# Patient Record
Sex: MALE | Race: BLACK OR AFRICAN AMERICAN | NOT HISPANIC OR LATINO | Employment: OTHER | URBAN - METROPOLITAN AREA
[De-identification: names, ages, dates, MRNs, and addresses within clinical notes are randomized per-mention and may not be internally consistent; named-entity substitution may affect disease eponyms.]

---

## 2019-05-10 PROBLEM — I83.019 VENOUS STASIS ULCERS OF BOTH LOWER EXTREMITIES: Status: ACTIVE | Noted: 2019-05-10

## 2019-05-10 PROBLEM — I42.8 NONISCHEMIC CARDIOMYOPATHY: Status: ACTIVE | Noted: 2019-05-10

## 2019-05-10 PROBLEM — L97.929 VENOUS STASIS ULCERS OF BOTH LOWER EXTREMITIES: Status: ACTIVE | Noted: 2019-05-10

## 2019-05-10 PROBLEM — F10.10 ALCOHOL ABUSE: Status: ACTIVE | Noted: 2019-05-10

## 2019-05-10 PROBLEM — Z72.0 TOBACCO ABUSE: Status: ACTIVE | Noted: 2019-05-10

## 2019-05-10 PROBLEM — R74.01 TRANSAMINITIS: Status: ACTIVE | Noted: 2019-05-10

## 2019-05-10 PROBLEM — I50.42 CHRONIC COMBINED SYSTOLIC AND DIASTOLIC HEART FAILURE: Status: ACTIVE | Noted: 2019-05-10

## 2019-05-10 PROBLEM — F12.10 MARIJUANA ABUSE: Status: ACTIVE | Noted: 2019-05-10

## 2019-05-10 PROBLEM — N18.30 STAGE 3 CHRONIC KIDNEY DISEASE: Status: ACTIVE | Noted: 2019-05-10

## 2019-05-10 PROBLEM — I83.029 VENOUS STASIS ULCERS OF BOTH LOWER EXTREMITIES: Status: ACTIVE | Noted: 2019-05-10

## 2019-05-10 PROBLEM — L97.919 VENOUS STASIS ULCERS OF BOTH LOWER EXTREMITIES: Status: ACTIVE | Noted: 2019-05-10

## 2019-05-10 PROBLEM — I87.8 CHRONIC VENOUS STASIS: Status: ACTIVE | Noted: 2019-05-10

## 2019-05-24 PROBLEM — J94.8 HYDROPNEUMOTHORAX: Status: ACTIVE | Noted: 2019-05-24

## 2019-05-28 PROBLEM — J94.8 HYDROPNEUMOTHORAX: Status: ACTIVE | Noted: 2019-05-28

## 2019-05-28 PROBLEM — J94.8 HYDROPNEUMOTHORAX: Status: RESOLVED | Noted: 2019-05-24 | Resolved: 2019-05-28

## 2019-06-05 LAB
ALBUMIN: 3.1 G/DL (ref 3.4–5)
ALP ISOS SERPL LEV INH-CCNC: 233 U/L (ref 45–117)
ALT (SGPT): 21 U/L (ref 16–61)
ANION GAP SERPL CALC-SCNC: 3 MMOL/L (ref 4–14)
AST SERPL-CCNC: 37 U/L (ref 15–37)
BILIRUB SERPL-MCNC: 1.4 MG/DL (ref 0.2–1)
BUN SERPL-MCNC: 32 MG/DL (ref 7–18)
BUN/CREAT RATIO: 14.3
CALCIUM SERPL-MCNC: 9 MG/DL (ref 8.5–10.1)
CHLORIDE SERPL-SCNC: 96 MMOL/L (ref 98–107)
CO2 SERPL-SCNC: 32 MMOL/L (ref 21–32)
CREAT SERPL-MCNC: 2.23 MG/DL (ref 0.6–1.3)
GFR MDRD AF AMER: 40 ML/MIN
GFR MDRD NON AF AMER: 33 ML/MIN
GLUCOSE: 92 MG/DL (ref 74–106)
MAGNESIUM SERPL-MCNC: 2.4 MG/DL (ref 1.8–2.4)
N-TERMINAL PROBNP (BNP): 2547 PG/ML
PHOSPHATE FLD-MCNC: 4.4 MG/DL (ref 2.5–4.9)
POTASSIUM: 4 MMOL/L (ref 3.5–5.1)
SODIUM: 131 MMOL/L (ref 136–145)
TOTAL PROTEIN: 8.2 G/DL (ref 6.4–8.2)

## 2019-06-05 RX ORDER — GABAPENTIN 300 MG/1
300 CAPSULE ORAL
Qty: 270 CAPSULE | Refills: 3 | OUTPATIENT
Start: 2019-06-05 | End: 2020-06-04

## 2019-06-18 ENCOUNTER — TELEPHONE (OUTPATIENT)
Dept: TRANSPLANT | Facility: CLINIC | Age: 39
End: 2019-06-18

## 2019-06-18 DIAGNOSIS — I50.9 CONGESTIVE HEART FAILURE, UNSPECIFIED HF CHRONICITY, UNSPECIFIED HEART FAILURE TYPE: Primary | ICD-10-CM

## 2019-06-18 NOTE — TELEPHONE ENCOUNTER
Called to schedule consult appointment. No answer. Unable to leave message as voicemail not set up.

## 2019-06-27 NOTE — TELEPHONE ENCOUNTER
Called to schedule consult appointment. No answer. Unable to leave message as recording says voicemail not set up.

## 2019-07-08 PROBLEM — D50.9 IRON DEFICIENCY ANEMIA: Status: ACTIVE | Noted: 2019-07-08

## 2019-07-08 NOTE — TELEPHONE ENCOUNTER
REFERRAL NOTE:    Artemio Cunningham has been referred to the pre-heart transplant office for consideration for orthotopic heart transplantation by Loni Duque. Spoke to patient's girlfriend Paradise ([phone]). Patient was present. As per request to be seen at middle to end of August, appointments have been scheduled for 08/15/19.Information was provided and questions were answered. [facility]/ Transplant Handout, appointment letter and campus map was mailed to the patient. My contact information was given. Call PRN.In basket message was sent to referring provider's office with appointment information.

## 2019-07-09 ENCOUNTER — TELEPHONE (OUTPATIENT)
Dept: ADMINISTRATIVE | Facility: HOSPITAL | Age: 39
End: 2019-07-09

## 2019-08-16 ENCOUNTER — TELEPHONE (OUTPATIENT)
Dept: TRANSPLANT | Facility: CLINIC | Age: 39
End: 2019-08-16

## 2019-08-16 NOTE — TELEPHONE ENCOUNTER
Called for f/u of missed initial consult visits scheduled on yesterday.  No answer or voice mail available.

## 2019-08-27 ENCOUNTER — TELEPHONE (OUTPATIENT)
Dept: TRANSPLANT | Facility: CLINIC | Age: 39
End: 2019-08-27

## 2019-08-28 ENCOUNTER — TELEPHONE (OUTPATIENT)
Dept: TRANSPLANT | Facility: CLINIC | Age: 39
End: 2019-08-28

## 2019-08-28 NOTE — TELEPHONE ENCOUNTER
Still unable to reach pt.  Heart transplant referral closed; will re open if/when pt calls to reschedule missed consult visit.

## 2019-10-09 PROBLEM — Z91.199 NON COMPLIANCE WITH MEDICAL TREATMENT: Status: ACTIVE | Noted: 2019-10-09

## 2019-10-09 PROBLEM — I95.9 HYPOTENSION: Status: ACTIVE | Noted: 2019-10-09

## 2019-10-10 ENCOUNTER — TELEPHONE (OUTPATIENT)
Dept: TRANSPLANT | Facility: CLINIC | Age: 39
End: 2019-10-10

## 2019-10-10 NOTE — TELEPHONE ENCOUNTER
In basket message received 10/09/19 from Grisel CHANG that she will work with patient on some things before she refer patient again to [facility] clinic.

## 2019-12-02 PROBLEM — J94.8 HYDROPNEUMOTHORAX: Status: RESOLVED | Noted: 2019-05-28 | Resolved: 2019-12-02
